# Patient Record
Sex: FEMALE | Race: WHITE | NOT HISPANIC OR LATINO
[De-identification: names, ages, dates, MRNs, and addresses within clinical notes are randomized per-mention and may not be internally consistent; named-entity substitution may affect disease eponyms.]

---

## 2021-05-24 ENCOUNTER — APPOINTMENT (OUTPATIENT)
Dept: ORTHOPEDIC SURGERY | Facility: CLINIC | Age: 76
End: 2021-05-24
Payer: MEDICARE

## 2021-05-24 VITALS
OXYGEN SATURATION: 95 % | SYSTOLIC BLOOD PRESSURE: 175 MMHG | BODY MASS INDEX: 26.31 KG/M2 | DIASTOLIC BLOOD PRESSURE: 89 MMHG | HEART RATE: 62 BPM | HEIGHT: 60 IN | WEIGHT: 134 LBS

## 2021-05-24 DIAGNOSIS — M51.36 OTHER INTERVERTEBRAL DISC DEGENERATION, LUMBAR REGION: ICD-10-CM

## 2021-05-24 DIAGNOSIS — M25.551 PAIN IN RIGHT HIP: ICD-10-CM

## 2021-05-24 DIAGNOSIS — M54.9 DORSALGIA, UNSPECIFIED: ICD-10-CM

## 2021-05-24 PROCEDURE — 99204 OFFICE O/P NEW MOD 45 MIN: CPT

## 2021-05-24 PROCEDURE — 72100 X-RAY EXAM L-S SPINE 2/3 VWS: CPT

## 2021-05-24 PROCEDURE — 99072 ADDL SUPL MATRL&STAF TM PHE: CPT

## 2021-05-24 PROCEDURE — 73502 X-RAY EXAM HIP UNI 2-3 VIEWS: CPT | Mod: RT

## 2021-05-24 NOTE — PHYSICAL EXAM
[de-identified] : Physical examination discloses mild muscle spasm to the right paravertebral lumbar region.  Forward flexion limited to 65 degrees.  Negative straight leg raise..  Deep tendon reflexes 1+ bilaterally\par Examination of the hips is unremarkable.  There is stable nontender range of motion bilaterally [de-identified] : X-rays taken of the lumbosacral spine and AP and lateral projections disclose multilevel degenerative disc space narrowing with scoliosis, anterior osteophytes.\par AP view of the pelvis is nonspecific no joint space narrowing is noted no arthritic changes

## 2021-05-24 NOTE — DISCUSSION/SUMMARY
[de-identified] : The patient was shown her x-rays advised of her findings consistent with a lumbosacral chronic degenerative disks.  She will will be referred to a spine specialist.  In the interim she will also undergo a course of physical therapy and continue her meloxicam.

## 2021-05-24 NOTE — HISTORY OF PRESENT ILLNESS
[Worsening] : worsening [___ wks] : [unfilled] week(s) ago [0] : a minimum pain level of 0/10 [10] : a maximum pain level of 10/10 [Intermit.] : ~He/She~ states the symptoms seem to be intermittent [Hip Movement] : worsened by hip movement [Walking] : worsened by walking [Rest] : relieved by rest [de-identified] : Pt presents for initial evaluation with pain in her right hip pt is pointing to the lower right back, that can radiate to the right groin and right thigh region., Pt has no recent injury,  however pt had a fall  2/2021. Pt has no numbness radiating to the right lower extremity, presently. Pt has taken meloxicam with relief. Pt has hx of back pain and has previously been treated with injections and therapy. Pt is ambulating with a cane. [de-identified] : certain movements [de-identified] : medication

## 2021-06-02 ENCOUNTER — APPOINTMENT (OUTPATIENT)
Dept: ORTHOPEDIC SURGERY | Facility: CLINIC | Age: 76
End: 2021-06-02

## 2022-06-06 ENCOUNTER — NON-APPOINTMENT (OUTPATIENT)
Age: 77
End: 2022-06-06

## 2023-01-31 ENCOUNTER — APPOINTMENT (OUTPATIENT)
Dept: RADIOLOGY | Facility: HOSPITAL | Age: 78
End: 2023-01-31
Payer: MEDICARE

## 2023-01-31 ENCOUNTER — OUTPATIENT (OUTPATIENT)
Dept: OUTPATIENT SERVICES | Facility: HOSPITAL | Age: 78
LOS: 1 days | End: 2023-01-31

## 2023-01-31 DIAGNOSIS — R13.19 OTHER DYSPHAGIA: ICD-10-CM

## 2023-01-31 PROCEDURE — 74220 X-RAY XM ESOPHAGUS 1CNTRST: CPT | Mod: 26
